# Patient Record
Sex: FEMALE | Race: WHITE | HISPANIC OR LATINO | URBAN - METROPOLITAN AREA
[De-identification: names, ages, dates, MRNs, and addresses within clinical notes are randomized per-mention and may not be internally consistent; named-entity substitution may affect disease eponyms.]

---

## 2022-03-09 ENCOUNTER — FOLLOW UP (OUTPATIENT)
Dept: URBAN - METROPOLITAN AREA CLINIC 20 | Facility: CLINIC | Age: 62
End: 2022-03-09

## 2022-03-09 DIAGNOSIS — H40.1231: ICD-10-CM

## 2022-03-09 PROCEDURE — 99213 OFFICE O/P EST LOW 20 MIN: CPT

## 2022-03-09 PROCEDURE — 92133 CPTRZD OPH DX IMG PST SGM ON: CPT

## 2022-03-09 ASSESSMENT — VISUAL ACUITY
OD_CC: 20/20
OS_CC: 20/20

## 2022-03-09 ASSESSMENT — TONOMETRY
OS_IOP_MMHG: 14
OD_IOP_MMHG: 12

## 2022-03-09 NOTE — PATIENT DISCUSSION
Plan: Treatment Regimen Glaucoma. - Continue Xalatan 0.005 % Eye Drops one drop at night to BOTH EYES  - Continue Cosopt 2 %-0.5 % Eye Drops one drop twice daily to BOTH EYE.

## 2022-07-13 ENCOUNTER — FOLLOW UP (OUTPATIENT)
Dept: URBAN - METROPOLITAN AREA CLINIC 20 | Facility: CLINIC | Age: 62
End: 2022-07-13

## 2022-07-13 DIAGNOSIS — H40.1231: ICD-10-CM

## 2022-07-13 PROCEDURE — 99213 OFFICE O/P EST LOW 20 MIN: CPT

## 2022-07-13 PROCEDURE — 92083 EXTENDED VISUAL FIELD XM: CPT

## 2022-07-13 ASSESSMENT — VISUAL ACUITY
OS_CC: 20/20
OD_CC: 20/20

## 2022-07-13 ASSESSMENT — TONOMETRY
OS_IOP_MMHG: 14
OD_IOP_MMHG: 16

## 2023-01-19 ENCOUNTER — ESTABLISHED PATIENT (OUTPATIENT)
Dept: URBAN - METROPOLITAN AREA CLINIC 20 | Facility: CLINIC | Age: 63
End: 2023-01-19

## 2023-01-19 DIAGNOSIS — H40.1231: ICD-10-CM

## 2023-01-19 PROCEDURE — 99213 OFFICE O/P EST LOW 20 MIN: CPT

## 2023-01-19 ASSESSMENT — KERATOMETRY
OD_AXISANGLE_DEGREES: 23
OD_K1POWER_DIOPTERS: 41.75
OD_AXISANGLE2_DEGREES: 113
OS_K1POWER_DIOPTERS: 42.00
OS_AXISANGLE2_DEGREES: 80
OD_K2POWER_DIOPTERS: 42.25
OS_K2POWER_DIOPTERS: 42.25
OS_AXISANGLE_DEGREES: 170

## 2023-01-19 ASSESSMENT — TONOMETRY
OD_IOP_MMHG: 16
OS_IOP_MMHG: 15

## 2023-01-19 ASSESSMENT — VISUAL ACUITY
OU_CC: 20/20-1
OS_CC: 20/20-1
OU_CC: J1+
OD_CC: 20/20

## 2023-01-19 NOTE — PATIENT DISCUSSION
After reviewing all prior findings/testing, do not believe pt has glaucoma/has ever had glaucoma. d/c lat and cosopt at this time. RTC 1mo for IOP ck.

## 2024-08-28 ENCOUNTER — COMPREHENSIVE EXAM (OUTPATIENT)
Dept: URBAN - METROPOLITAN AREA CLINIC 20 | Facility: CLINIC | Age: 64
End: 2024-08-28

## 2024-08-28 DIAGNOSIS — H52.4: ICD-10-CM

## 2024-08-28 PROCEDURE — 92014 COMPRE OPH EXAM EST PT 1/>: CPT

## 2024-08-28 PROCEDURE — 92015 DETERMINE REFRACTIVE STATE: CPT

## 2024-08-28 ASSESSMENT — KERATOMETRY
OD_K2POWER_DIOPTERS: 42.50
OS_AXISANGLE2_DEGREES: 90
OD_AXISANGLE2_DEGREES: 104
OD_K1POWER_DIOPTERS: 41.75
OS_AXISANGLE_DEGREES: 0
OS_K2POWER_DIOPTERS: 42.00
OS_K1POWER_DIOPTERS: 42.00
OD_AXISANGLE_DEGREES: 14

## 2024-08-28 ASSESSMENT — TONOMETRY
OS_IOP_MMHG: 13
OD_IOP_MMHG: 10

## 2024-08-28 ASSESSMENT — VISUAL ACUITY
OU_CC: 20/25
OS_CC: J2
OD_CC: 20/25
OU_CC: J1-2
OD_CC: J3
OS_CC: 20/25